# Patient Record
Sex: FEMALE | Race: BLACK OR AFRICAN AMERICAN | URBAN - METROPOLITAN AREA
[De-identification: names, ages, dates, MRNs, and addresses within clinical notes are randomized per-mention and may not be internally consistent; named-entity substitution may affect disease eponyms.]

---

## 2023-03-01 ENCOUNTER — INPATIENT (INPATIENT)
Facility: HOSPITAL | Age: 73
LOS: 0 days | Discharge: HOME | DRG: 305 | End: 2023-03-02
Attending: HOSPITALIST | Admitting: INTERNAL MEDICINE
Payer: MEDICARE

## 2023-03-01 VITALS
OXYGEN SATURATION: 98 % | TEMPERATURE: 98 F | SYSTOLIC BLOOD PRESSURE: 213 MMHG | DIASTOLIC BLOOD PRESSURE: 92 MMHG | RESPIRATION RATE: 19 BRPM | HEART RATE: 90 BPM

## 2023-03-01 DIAGNOSIS — R94.31 ABNORMAL ELECTROCARDIOGRAM [ECG] [EKG]: ICD-10-CM

## 2023-03-01 DIAGNOSIS — I10 ESSENTIAL (PRIMARY) HYPERTENSION: ICD-10-CM

## 2023-03-01 LAB
ALBUMIN SERPL ELPH-MCNC: 4.5 G/DL — SIGNIFICANT CHANGE UP (ref 3.5–5.2)
ALP SERPL-CCNC: 103 U/L — SIGNIFICANT CHANGE UP (ref 30–115)
ALT FLD-CCNC: 17 U/L — SIGNIFICANT CHANGE UP (ref 0–41)
ANION GAP SERPL CALC-SCNC: 12 MMOL/L — SIGNIFICANT CHANGE UP (ref 7–14)
APTT BLD: 30.4 SEC — SIGNIFICANT CHANGE UP (ref 27–39.2)
AST SERPL-CCNC: 30 U/L — SIGNIFICANT CHANGE UP (ref 0–41)
BASOPHILS # BLD AUTO: 0.06 K/UL — SIGNIFICANT CHANGE UP (ref 0–0.2)
BASOPHILS NFR BLD AUTO: 0.9 % — SIGNIFICANT CHANGE UP (ref 0–1)
BILIRUB SERPL-MCNC: 0.3 MG/DL — SIGNIFICANT CHANGE UP (ref 0.2–1.2)
BUN SERPL-MCNC: 21 MG/DL — HIGH (ref 10–20)
CALCIUM SERPL-MCNC: 9.2 MG/DL — SIGNIFICANT CHANGE UP (ref 8.4–10.5)
CHLORIDE SERPL-SCNC: 105 MMOL/L — SIGNIFICANT CHANGE UP (ref 98–110)
CO2 SERPL-SCNC: 24 MMOL/L — SIGNIFICANT CHANGE UP (ref 17–32)
CREAT SERPL-MCNC: 1 MG/DL — SIGNIFICANT CHANGE UP (ref 0.7–1.5)
EGFR: 60 ML/MIN/1.73M2 — SIGNIFICANT CHANGE UP
EOSINOPHIL # BLD AUTO: 0.05 K/UL — SIGNIFICANT CHANGE UP (ref 0–0.7)
EOSINOPHIL NFR BLD AUTO: 0.7 % — SIGNIFICANT CHANGE UP (ref 0–8)
GLUCOSE BLDC GLUCOMTR-MCNC: 104 MG/DL — HIGH (ref 70–99)
GLUCOSE BLDC GLUCOMTR-MCNC: 279 MG/DL — HIGH (ref 70–99)
GLUCOSE BLDC GLUCOMTR-MCNC: 40 MG/DL — CRITICAL LOW (ref 70–99)
GLUCOSE BLDC GLUCOMTR-MCNC: 62 MG/DL — LOW (ref 70–99)
GLUCOSE SERPL-MCNC: 192 MG/DL — HIGH (ref 70–99)
HCT VFR BLD CALC: 42.2 % — SIGNIFICANT CHANGE UP (ref 37–47)
HGB BLD-MCNC: 14.1 G/DL — SIGNIFICANT CHANGE UP (ref 12–16)
IMM GRANULOCYTES NFR BLD AUTO: 0.1 % — SIGNIFICANT CHANGE UP (ref 0.1–0.3)
INR BLD: 0.86 RATIO — SIGNIFICANT CHANGE UP (ref 0.65–1.3)
LYMPHOCYTES # BLD AUTO: 2.48 K/UL — SIGNIFICANT CHANGE UP (ref 1.2–3.4)
LYMPHOCYTES # BLD AUTO: 36 % — SIGNIFICANT CHANGE UP (ref 20.5–51.1)
MAGNESIUM SERPL-MCNC: 2.2 MG/DL — SIGNIFICANT CHANGE UP (ref 1.8–2.4)
MCHC RBC-ENTMCNC: 30.7 PG — SIGNIFICANT CHANGE UP (ref 27–31)
MCHC RBC-ENTMCNC: 33.4 G/DL — SIGNIFICANT CHANGE UP (ref 32–37)
MCV RBC AUTO: 91.7 FL — SIGNIFICANT CHANGE UP (ref 81–99)
MONOCYTES # BLD AUTO: 0.58 K/UL — SIGNIFICANT CHANGE UP (ref 0.1–0.6)
MONOCYTES NFR BLD AUTO: 8.4 % — SIGNIFICANT CHANGE UP (ref 1.7–9.3)
NEUTROPHILS # BLD AUTO: 3.7 K/UL — SIGNIFICANT CHANGE UP (ref 1.4–6.5)
NEUTROPHILS NFR BLD AUTO: 53.9 % — SIGNIFICANT CHANGE UP (ref 42.2–75.2)
NRBC # BLD: 0 /100 WBCS — SIGNIFICANT CHANGE UP (ref 0–0)
NT-PROBNP SERPL-SCNC: 130 PG/ML — SIGNIFICANT CHANGE UP (ref 0–300)
PLATELET # BLD AUTO: 221 K/UL — SIGNIFICANT CHANGE UP (ref 130–400)
POTASSIUM SERPL-MCNC: 4.4 MMOL/L — SIGNIFICANT CHANGE UP (ref 3.5–5)
POTASSIUM SERPL-SCNC: 4.4 MMOL/L — SIGNIFICANT CHANGE UP (ref 3.5–5)
PROT SERPL-MCNC: 8.6 G/DL — HIGH (ref 6–8)
PROTHROM AB SERPL-ACNC: 9.7 SEC — LOW (ref 9.95–12.87)
RBC # BLD: 4.6 M/UL — SIGNIFICANT CHANGE UP (ref 4.2–5.4)
RBC # FLD: 13.4 % — SIGNIFICANT CHANGE UP (ref 11.5–14.5)
SARS-COV-2 RNA SPEC QL NAA+PROBE: SIGNIFICANT CHANGE UP
SODIUM SERPL-SCNC: 141 MMOL/L — SIGNIFICANT CHANGE UP (ref 135–146)
TROPONIN T SERPL-MCNC: <0.01 NG/ML — SIGNIFICANT CHANGE UP
WBC # BLD: 6.88 K/UL — SIGNIFICANT CHANGE UP (ref 4.8–10.8)
WBC # FLD AUTO: 6.88 K/UL — SIGNIFICANT CHANGE UP (ref 4.8–10.8)

## 2023-03-01 PROCEDURE — 85025 COMPLETE CBC W/AUTO DIFF WBC: CPT

## 2023-03-01 PROCEDURE — 83036 HEMOGLOBIN GLYCOSYLATED A1C: CPT

## 2023-03-01 PROCEDURE — 99291 CRITICAL CARE FIRST HOUR: CPT

## 2023-03-01 PROCEDURE — 99223 1ST HOSP IP/OBS HIGH 75: CPT

## 2023-03-01 PROCEDURE — 71045 X-RAY EXAM CHEST 1 VIEW: CPT | Mod: 26

## 2023-03-01 PROCEDURE — 83735 ASSAY OF MAGNESIUM: CPT

## 2023-03-01 PROCEDURE — 82962 GLUCOSE BLOOD TEST: CPT

## 2023-03-01 PROCEDURE — 86803 HEPATITIS C AB TEST: CPT

## 2023-03-01 PROCEDURE — 36415 COLL VENOUS BLD VENIPUNCTURE: CPT

## 2023-03-01 PROCEDURE — 80053 COMPREHEN METABOLIC PANEL: CPT

## 2023-03-01 RX ORDER — AMLODIPINE BESYLATE 2.5 MG/1
5 TABLET ORAL DAILY
Refills: 0 | Status: DISCONTINUED | OUTPATIENT
Start: 2023-03-01 | End: 2023-03-01

## 2023-03-01 RX ORDER — OXYBUTYNIN CHLORIDE 5 MG
1 TABLET ORAL
Qty: 0 | Refills: 0 | DISCHARGE

## 2023-03-01 RX ORDER — NITROGLYCERIN 6.5 MG
1 CAPSULE, EXTENDED RELEASE ORAL
Qty: 0 | Refills: 0 | DISCHARGE

## 2023-03-01 RX ORDER — SEMAGLUTIDE 0.68 MG/ML
1 INJECTION, SOLUTION SUBCUTANEOUS
Qty: 0 | Refills: 0 | DISCHARGE

## 2023-03-01 RX ORDER — SODIUM CHLORIDE 9 MG/ML
1000 INJECTION, SOLUTION INTRAVENOUS
Refills: 0 | Status: DISCONTINUED | OUTPATIENT
Start: 2023-03-01 | End: 2023-03-02

## 2023-03-01 RX ORDER — DEXTROSE 50 % IN WATER 50 %
12.5 SYRINGE (ML) INTRAVENOUS ONCE
Refills: 0 | Status: DISCONTINUED | OUTPATIENT
Start: 2023-03-01 | End: 2023-03-02

## 2023-03-01 RX ORDER — ATORVASTATIN CALCIUM 80 MG/1
80 TABLET, FILM COATED ORAL AT BEDTIME
Refills: 0 | Status: DISCONTINUED | OUTPATIENT
Start: 2023-03-01 | End: 2023-03-02

## 2023-03-01 RX ORDER — METOPROLOL TARTRATE 50 MG
20 TABLET ORAL ONCE
Refills: 0 | Status: DISCONTINUED | OUTPATIENT
Start: 2023-03-01 | End: 2023-03-01

## 2023-03-01 RX ORDER — DEXTROSE 50 % IN WATER 50 %
15 SYRINGE (ML) INTRAVENOUS ONCE
Refills: 0 | Status: DISCONTINUED | OUTPATIENT
Start: 2023-03-01 | End: 2023-03-02

## 2023-03-01 RX ORDER — LABETALOL HCL 100 MG
20 TABLET ORAL ONCE
Refills: 0 | Status: COMPLETED | OUTPATIENT
Start: 2023-03-01 | End: 2023-03-01

## 2023-03-01 RX ORDER — HYDRALAZINE HCL 50 MG
50 TABLET ORAL THREE TIMES A DAY
Refills: 0 | Status: DISCONTINUED | OUTPATIENT
Start: 2023-03-01 | End: 2023-03-02

## 2023-03-01 RX ORDER — ENOXAPARIN SODIUM 100 MG/ML
40 INJECTION SUBCUTANEOUS EVERY 24 HOURS
Refills: 0 | Status: DISCONTINUED | OUTPATIENT
Start: 2023-03-01 | End: 2023-03-02

## 2023-03-01 RX ORDER — INSULIN GLARGINE 100 [IU]/ML
30 INJECTION, SOLUTION SUBCUTANEOUS AT BEDTIME
Refills: 0 | Status: DISCONTINUED | OUTPATIENT
Start: 2023-03-01 | End: 2023-03-01

## 2023-03-01 RX ORDER — INSULIN GLARGINE 100 [IU]/ML
30 INJECTION, SOLUTION SUBCUTANEOUS
Qty: 0 | Refills: 0 | DISCHARGE

## 2023-03-01 RX ORDER — HYDRALAZINE HCL 50 MG
10 TABLET ORAL ONCE
Refills: 0 | Status: COMPLETED | OUTPATIENT
Start: 2023-03-01 | End: 2023-03-01

## 2023-03-01 RX ORDER — EMPAGLIFLOZIN 10 MG/1
1 TABLET, FILM COATED ORAL
Qty: 0 | Refills: 0 | DISCHARGE

## 2023-03-01 RX ORDER — NITROGLYCERIN 6.5 MG
0.4 CAPSULE, EXTENDED RELEASE ORAL
Refills: 0 | Status: DISCONTINUED | OUTPATIENT
Start: 2023-03-01 | End: 2023-03-02

## 2023-03-01 RX ORDER — DEXTROSE 50 % IN WATER 50 %
25 SYRINGE (ML) INTRAVENOUS ONCE
Refills: 0 | Status: DISCONTINUED | OUTPATIENT
Start: 2023-03-01 | End: 2023-03-02

## 2023-03-01 RX ORDER — VALSARTAN 80 MG/1
80 TABLET ORAL DAILY
Refills: 0 | Status: DISCONTINUED | OUTPATIENT
Start: 2023-03-01 | End: 2023-03-02

## 2023-03-01 RX ORDER — LATANOPROST 0.05 MG/ML
1 SOLUTION/ DROPS OPHTHALMIC; TOPICAL
Qty: 0 | Refills: 0 | DISCHARGE

## 2023-03-01 RX ORDER — ACETAMINOPHEN 500 MG
975 TABLET ORAL ONCE
Refills: 0 | Status: COMPLETED | OUTPATIENT
Start: 2023-03-01 | End: 2023-03-01

## 2023-03-01 RX ORDER — OXYBUTYNIN CHLORIDE 5 MG
10 TABLET ORAL DAILY
Refills: 0 | Status: DISCONTINUED | OUTPATIENT
Start: 2023-03-01 | End: 2023-03-02

## 2023-03-01 RX ORDER — LATANOPROST 0.05 MG/ML
1 SOLUTION/ DROPS OPHTHALMIC; TOPICAL AT BEDTIME
Refills: 0 | Status: DISCONTINUED | OUTPATIENT
Start: 2023-03-01 | End: 2023-03-02

## 2023-03-01 RX ORDER — ATORVASTATIN CALCIUM 80 MG/1
1 TABLET, FILM COATED ORAL
Qty: 0 | Refills: 0 | DISCHARGE

## 2023-03-01 RX ORDER — LOSARTAN POTASSIUM 100 MG/1
100 TABLET, FILM COATED ORAL DAILY
Refills: 0 | Status: DISCONTINUED | OUTPATIENT
Start: 2023-03-01 | End: 2023-03-01

## 2023-03-01 RX ORDER — METOPROLOL TARTRATE 50 MG
1 TABLET ORAL
Qty: 0 | Refills: 0 | DISCHARGE

## 2023-03-01 RX ORDER — METOPROLOL TARTRATE 50 MG
200 TABLET ORAL DAILY
Refills: 0 | Status: DISCONTINUED | OUTPATIENT
Start: 2023-03-01 | End: 2023-03-02

## 2023-03-01 RX ORDER — INSULIN GLARGINE 100 [IU]/ML
30 INJECTION, SOLUTION SUBCUTANEOUS EVERY MORNING
Refills: 0 | Status: DISCONTINUED | OUTPATIENT
Start: 2023-03-01 | End: 2023-03-02

## 2023-03-01 RX ORDER — LABETALOL HCL 100 MG
10 TABLET ORAL ONCE
Refills: 0 | Status: COMPLETED | OUTPATIENT
Start: 2023-03-01 | End: 2023-03-01

## 2023-03-01 RX ORDER — INSULIN LISPRO 100/ML
VIAL (ML) SUBCUTANEOUS
Refills: 0 | Status: DISCONTINUED | OUTPATIENT
Start: 2023-03-01 | End: 2023-03-02

## 2023-03-01 RX ORDER — FUROSEMIDE 40 MG
1 TABLET ORAL
Qty: 0 | Refills: 0 | DISCHARGE

## 2023-03-01 RX ORDER — GLUCAGON INJECTION, SOLUTION 0.5 MG/.1ML
1 INJECTION, SOLUTION SUBCUTANEOUS ONCE
Refills: 0 | Status: DISCONTINUED | OUTPATIENT
Start: 2023-03-01 | End: 2023-03-02

## 2023-03-01 RX ORDER — AMLODIPINE BESYLATE 2.5 MG/1
10 TABLET ORAL ONCE
Refills: 0 | Status: COMPLETED | OUTPATIENT
Start: 2023-03-01 | End: 2023-03-01

## 2023-03-01 RX ADMIN — Medication 975 MILLIGRAM(S): at 14:30

## 2023-03-01 RX ADMIN — Medication 10 MILLIGRAM(S): at 13:18

## 2023-03-01 RX ADMIN — Medication 50 MILLIGRAM(S): at 22:38

## 2023-03-01 RX ADMIN — AMLODIPINE BESYLATE 10 MILLIGRAM(S): 2.5 TABLET ORAL at 12:14

## 2023-03-01 RX ADMIN — ENOXAPARIN SODIUM 40 MILLIGRAM(S): 100 INJECTION SUBCUTANEOUS at 22:38

## 2023-03-01 RX ADMIN — ATORVASTATIN CALCIUM 80 MILLIGRAM(S): 80 TABLET, FILM COATED ORAL at 22:39

## 2023-03-01 RX ADMIN — Medication 10 MILLIGRAM(S): at 23:34

## 2023-03-01 NOTE — ED PROVIDER NOTE - CLINICAL SUMMARY MEDICAL DECISION MAKING FREE TEXT BOX
72-year-old female with history of hypertension on multiple medications for this (states she took it today), hyperlipidemia, diabetes, presents as she states her body was not feeling well this morning and she was hypertensive. She states this was all caused by having to argue over an [employer] to get her over to the ophthalmology clinic for an appointment today. It is now resolved. Denies ever having had chest pain, shortness of breath, vomiting, dizziness, diaphoresis, cough, fever, leg pain or swelling, recent long distance travel. On exam, afebrile, hemodynamically stable, hypertensive, saturating well on room air, NAD, well appearing, sitting comfortably in bed, no WOB, speaking full sentences, head NCAT, EOMI, anicteric, MMM, no JVD, RRR, nml S1/S2, no m/r/g, lungs CTAB, no w/r/r, abd soft, NT, ND, nml BS, no rebound or guarding, AAO, CN's 3-12 intact, motor 5/5 in all extremities, ESTRADA spontaneously, no leg cyanosis or edema, 2+ symmetric radial pulses, skin warm, well perfused, no rashes or hives. Patient with nonspecific symptoms. ECG concerning, though with no chest pain/shortness of breath and states had a negative stress test in November, and troponin negative. Code STEMI called on arrival due to submillimeter aVR ST elevation, canceled due to lack of symptoms at this time. No stroke signs or symptoms. No evidence of fluid overload. Cr unremarkable. Patient's hypertension unimproved despite p.o. amlodipine, given 10 mg then 20 mg labetalol with improvement. Asymptomatic throughout and do not think patient needs to be in ICU at this time for drip. Patient well appearing, hemodynamically stable. Admitted to internal medicine for further monitoring, w/u, and care.

## 2023-03-01 NOTE — CHART NOTE - NSCHARTNOTEFT_GEN_A_CORE
Code STEMI was activated for this patient to which I responded immediately. 73 Y/O F with PMHx of HTN, HLD, DM presented to the hospital for generalized weakness. Pt denied any chest pain. EKG was reviewed it showed TWI in lateral leads. Case was discussed with interventional cardiologist on call and code STEMI was cancelled.

## 2023-03-01 NOTE — H&P ADULT - ASSESSMENT
72-year-old female with past medical history of hypertension, DM-2, hyperlipidemia presented to the ED for being anxious and not feeling well.  Admitted for hypertensive emergency.       #Hypertensive Emergency   #Anxious  #[place]  - BP at arrival 213/19  - Received Amlodipine and labetalol pushes in ED.  - Restarted on home meds ie Hydralazine, valsartan, metoprolol.  - BP now improving 198/92. Patient currently reports no symptoms.  - Closely monitor BP      #DM  -Takes lantus, ozempic and jordiance at home  -will start on basal bolus insulin during hospital stay    #Hyperlipidemia  -takes atorvastatin 80mg OD  -continue home dose       DVT prophylaxis: ENOXAPARIN   Activity:  Diet: DASH  Dispo:  Code: FULL         72-year-old female with past medical history of hypertension, DM-2, hyperlipidemia presented to the ED for being anxious and not feeling well.  Admitted for hypertensive emergency.       #Hypertensive Emergency   #Anxious  #Headache  - BP at arrival 213/19  - Received Amlodipine and labetalol pushes in ED.  - Restarted on home meds ie Hydralazine, valsartan, metoprolol.  - BP now improving 198/92. Patient currently reports no symptoms.  - Closely monitor BP      #DM  -Takes lantus, ozempic and jordiance at home  -will start on basal bolus insulin during hospital stay    #Hyperlipidemia  -takes atorvastatin 80mg OD  -continue home dose       DVT prophylaxis: ENOXAPARIN   Activity:  Diet: DASH  Dispo:  Code: FULL      Dr. Lacho Tracey  Attending Hospitalist    72-year-old female with past medical history of hypertension, DM-2, hyperlipidemia presented to the ED for being anxious and not feeling well.  Admitted for hypertensive emergency.       #Hypertensive Emergency   #Anxious  #Headache  - BP at arrival 213/19  - Received Amlodipine and labetalol pushes in ED.  - Restarted on home meds ie Hydralazine, valsartan, metoprolol.  - BP now improving 198/92. Patient currently reports no symptoms.  - Closely monitor BP  - Check TSH and renal artery doppler      #DM  -Takes lantus, ozempic and jordiance at home  -will start on basal bolus insulin during hospital stay    #Hyperlipidemia  -takes atorvastatin 80mg OD  -continue home dose       DVT prophylaxis: ENOXAPARIN   Activity:  Diet: DASH  Dispo:  Code: FULL      Dr. Lacho Tracey  Attending Hospitalist

## 2023-03-01 NOTE — ED ADULT NURSE NOTE - OBJECTIVE STATEMENT
Pt c/o feeling "weird" describes her body as feeling "loose" s/p becoming aggravated in the morning. Pt denies any chest pain, shortness of breath, numbness, tingling.

## 2023-03-01 NOTE — H&P ADULT - NSHPPHYSICALEXAM_GEN_ALL_CORE
LOS:     VITALS:   T(C): 36.6 (03-01-23 @ 10:15), Max: 36.6 (03-01-23 @ 10:15)  HR: 64 (03-01-23 @ 15:24) (64 - 90)  BP: 198/92 (03-01-23 @ 15:24) (188/87 - 225/101)  RR: 22 (03-01-23 @ 11:45) (19 - 22)  SpO2: 100% (03-01-23 @ 11:45) (98% - 100%)    GENERAL: NAD, lying in bed comfortably  HEAD:  Atraumatic, Normocephalic  EYES: EOMI, PERRLA, conjunctiva and sclera clear  ENT: Moist mucous membranes  NECK: Supple, No JVD  CHEST/LUNG: Clear to auscultation bilaterally; No rales, rhonchi, wheezing, or rubs. Unlabored respirations  HEART: Regular rate and rhythm; No murmurs, rubs, or gallops  ABDOMEN: BSx4; Soft, nontender, nondistended  EXTREMITIES:  no edema  NERVOUS SYSTEM:  A&Ox3, no focal deficits

## 2023-03-01 NOTE — ED PROVIDER NOTE - ATTENDING APP SHARED VISIT CONTRIBUTION OF CARE
72-year-old female with history of hypertension on multiple medications for this (states she took it today), hyperlipidemia, diabetes, presents as she states her body was not feeling well this morning and she was hypertensive. She states this was all caused by having to argue over an [employer] to get her over to the ophthalmology clinic for an appointment today. It is now resolved. Denies ever having had chest pain, shortness of breath, vomiting, dizziness, diaphoresis, cough, fever, leg pain or swelling, recent long distance travel. On exam, afebrile, hemodynamically stable, hypertensive, saturating well on room air, NAD, well appearing, sitting comfortably in bed, no WOB, speaking full sentences, head NCAT, EOMI, anicteric, MMM, no JVD, RRR, nml S1/S2, no m/r/g, lungs CTAB, no w/r/r, abd soft, NT, ND, nml BS, no rebound or guarding, AAO, CN's 3-12 intact, motor 5/5 in all extremities, ESTRADA spontaneously, no leg cyanosis or edema, 2+ symmetric radial pulses, skin warm, well perfused, no rashes or hives. Patient with nonspecific symptoms. ECG concerning, though with no chest pain/shortness of breath and states had a negative stress test in November, and troponin ________. Code STEMI called on arrival due to submillimeter aVR ST elevation, canceled due to lack of symptoms at this time. No stroke signs or symptoms. No evidence of fluid overload. Cr ______. Pt's hypertension improved

## 2023-03-01 NOTE — ED PROVIDER NOTE - OBJECTIVE STATEMENT
72y F pmh HTN, HLD, DM presents for evaluation. Pt states she came to hospital today for ophthalmology appt but became upset when she got in an argument with her  and arrived for to the ophthalmology office to be told her appt is tomorrow not today. After this pt started to feel a generalized discomfort across her body leading her to come to ED. Found to be HTN and st elevations on EKG in triage. Cardiologist Dr. Mejia. Denies fever, ha, cp, sob, cough, weakness, numbness, dysuria, hematuria, n/v/d/c

## 2023-03-01 NOTE — ED ADULT NURSE NOTE - CHIEF COMPLAINT QUOTE
"I feel my blood pressure soaring high. I was aggravated today with my [employer] , then when I got to the eye clinic, my appointment is actually for tomorrow. I got more aggravated." Triage BP: Left Arm 213/93, Right Arm 211/90. Pt took Metoprolol prior to leaving her house this morning. EKG done. Code STEMI activated.

## 2023-03-01 NOTE — ED PROVIDER NOTE - ATTENDING CONTRIBUTION TO CARE
Attending Statement: This was a shared visit with the RAHUL. I reviewed and verified the documentation and independently performed the documented:     History, Exam and Medical Decision Making.     Attending Contribution to Care: 72-year-old female with history of hypertension on multiple medications for this (states she took it today), hyperlipidemia, diabetes, presents as she states her body was not feeling well this morning and she was hypertensive. She states this was all caused by having to argue over an [employer] to get her over to the ophthalmology clinic for an appointment today. It is now resolved. Denies ever having had chest pain, shortness of breath, vomiting, dizziness, diaphoresis, cough, fever, leg pain or swelling, recent long distance travel. On exam, afebrile, hemodynamically stable, hypertensive, saturating well on room air, NAD, well appearing, sitting comfortably in bed, no WOB, speaking full sentences, head NCAT, EOMI, anicteric, MMM, no JVD, RRR, nml S1/S2, no m/r/g, lungs CTAB, no w/r/r, abd soft, NT, ND, nml BS, no rebound or guarding, AAO, CN's 3-12 intact, motor 5/5 in all extremities, ESTRADA spontaneously, no leg cyanosis or edema, 2+ symmetric radial pulses, skin warm, well perfused, no rashes or hives. Patient with nonspecific symptoms. ECG concerning, though with no chest pain/shortness of breath and states had a negative stress test in November, and troponin negative. Code STEMI called on arrival due to submillimeter aVR ST elevation, canceled due to lack of symptoms at this time. No stroke signs or symptoms. No evidence of fluid overload. Cr unremarkable. Patient's hypertension unimproved despite p.o. amlodipine, given 10 mg then 20 mg labetalol with improvement. Asymptomatic throughout and do not think patient needs to be in ICU at this time for drip. Patient well appearing, hemodynamically stable. Admitted to internal medicine for further monitoring, w/u, and care.

## 2023-03-01 NOTE — ED PROVIDER NOTE - NS ED ATTENDING STATEMENT MOD
This was a shared visit with the RAHUL. I reviewed and verified the documentation and independently performed the documented: I have personally provided the amount of critical care time documented below concurrently with the resident/fellow.  This time excludes time spent on separate procedures and time spent teaching. I have reviewed the resident’s / fellow’s documentation and I agree with the history, exam, and assessment and plan of care.

## 2023-03-01 NOTE — H&P ADULT - ATTENDING COMMENTS
72-year-old female with past medical history of hypertension, DM-2, hyperlipidemia presented to the ED for being anxious and not feeling well.  Admitted for hypertensive emergency.       #Hypertensive Emergency   #Anxious  #Headache  - BP at arrival 213/19  - Received Amlodipine and labetalol pushes in ED.  - Restarted on home meds ie Hydralazine, valsartan, metoprolol.  - BP now improving 198/92. Patient currently reports no symptoms.  - Closely monitor BP      #DM  -Takes lantus, ozempic and jordiance at home  -will start on basal bolus insulin during hospital stay    #Hyperlipidemia  -takes atorvastatin 80mg OD  -continue home dose       DVT prophylaxis: ENOXAPARIN   Activity:  Diet: DASH  Dispo:  Code: FULL      Dr. Lacho Tracey  Attending Hospitalist 72-year-old female with past medical history of hypertension, DM-2, hyperlipidemia presented to the ED for being anxious and not feeling well.  Admitted for hypertensive emergency.       #Hypertensive Emergency   #Anxious  #Headache  - BP at arrival 213/19  - Received Amlodipine and labetalol pushes in ED.  - Restarted on home meds ie Hydralazine, valsartan, metoprolol.  - BP now improving 198/92. Patient currently reports no symptoms.  - Closely monitor BP  -check TSH and renal artery doppler      #DM  -Takes lantus, ozempic and jordiance at home  -will start on basal bolus insulin during hospital stay    #Hyperlipidemia  -takes atorvastatin 80mg OD  -continue home dose       DVT prophylaxis: ENOXAPARIN   Activity:  Diet: DASH  Dispo:  Code: FULL      Dr. Lahco Tracey  Attending Hospitalist

## 2023-03-01 NOTE — H&P ADULT - NSHPREVIEWOFSYSTEMS_GEN_ALL_CORE
REVIEW OF SYSTEMS:    CONSTITUTIONAL:  No weakness, fevers or chills, headache+, anxious+  EYES/ENT:  No visual changes;  No vertigo or throat pain   NECK:  No pain or stiffness  RESPIRATORY:  No cough, wheezing, hemoptysis; No shortness of breath  CARDIOVASCULAR:  No chest pain or palpitations  GASTROINTESTINAL:  No abdominal or epigastric pain. No nausea, vomiting, or hematemesis; No diarrhea or constipation. No melena or hematochezia.  GENITOURINARY:  No dysuria, frequency or hematuria  NEUROLOGICAL:  No numbness or weakness  SKIN:  No itching, rashes

## 2023-03-01 NOTE — ED ADULT TRIAGE NOTE - CHIEF COMPLAINT QUOTE
"I feel my blood pressure soaring high. I was aggravated today with my [employer] , then when I got to the eye clinic, my appointment is actually for tomorrow. I got more aggravated." Triage BP: Left Arm 213/93, Right Arm 211/90. Pt took Metoprolol prior to leaving her house this morning. "I feel my blood pressure soaring high. I was aggravated today with my [employer] , then when I got to the eye clinic, my appointment is actually for tomorrow. I got more aggravated." Triage BP: Left Arm 213/93, Right Arm 211/90. Pt took Metoprolol prior to leaving her house this morning. EKG done. Code STEMI activated.

## 2023-03-01 NOTE — ED PROVIDER NOTE - ATTENDING SHARED VISIT SELECTORS
History/Exam/Medical Decision Making
[facility]  CARDIOVASCULAR DISEASE  [address]  [place] [address]  Phone: [phone]  Fax: [phone]  Follow Up Time: 1-3 Days    Ananda Rahman  INTERNAL MEDICINE  [address] [place] [address]  Phone: [phone]  Fax: [phone]  Follow Up Time: 1-3 Days

## 2023-03-01 NOTE — PATIENT PROFILE ADULT - FALL HARM RISK - HARM RISK INTERVENTIONS

## 2023-03-01 NOTE — H&P ADULT - NSICDXPASTMEDICALHX_GEN_ALL_CORE_FT
PAST MEDICAL HISTORY:  DMII (diabetes mellitus, type 2)     HTN (hypertension)     Hyperlipidemia

## 2023-03-02 ENCOUNTER — TRANSCRIPTION ENCOUNTER (OUTPATIENT)
Age: 73
End: 2023-03-02

## 2023-03-02 VITALS — HEART RATE: 74 BPM | DIASTOLIC BLOOD PRESSURE: 64 MMHG | SYSTOLIC BLOOD PRESSURE: 140 MMHG

## 2023-03-02 LAB
A1C WITH ESTIMATED AVERAGE GLUCOSE RESULT: 6.1 % — HIGH (ref 4–5.6)
ALBUMIN SERPL ELPH-MCNC: 3.8 G/DL — SIGNIFICANT CHANGE UP (ref 3.5–5.2)
ALP SERPL-CCNC: 93 U/L — SIGNIFICANT CHANGE UP (ref 30–115)
ALT FLD-CCNC: 14 U/L — SIGNIFICANT CHANGE UP (ref 0–41)
ANION GAP SERPL CALC-SCNC: 10 MMOL/L — SIGNIFICANT CHANGE UP (ref 7–14)
AST SERPL-CCNC: 17 U/L — SIGNIFICANT CHANGE UP (ref 0–41)
BASOPHILS # BLD AUTO: 0.05 K/UL — SIGNIFICANT CHANGE UP (ref 0–0.2)
BASOPHILS NFR BLD AUTO: 0.8 % — SIGNIFICANT CHANGE UP (ref 0–1)
BILIRUB SERPL-MCNC: 0.2 MG/DL — SIGNIFICANT CHANGE UP (ref 0.2–1.2)
BUN SERPL-MCNC: 22 MG/DL — HIGH (ref 10–20)
CALCIUM SERPL-MCNC: 9.3 MG/DL — SIGNIFICANT CHANGE UP (ref 8.4–10.5)
CHLORIDE SERPL-SCNC: 108 MMOL/L — SIGNIFICANT CHANGE UP (ref 98–110)
CO2 SERPL-SCNC: 26 MMOL/L — SIGNIFICANT CHANGE UP (ref 17–32)
CREAT SERPL-MCNC: 1 MG/DL — SIGNIFICANT CHANGE UP (ref 0.7–1.5)
EGFR: 60 ML/MIN/1.73M2 — SIGNIFICANT CHANGE UP
EOSINOPHIL # BLD AUTO: 0.07 K/UL — SIGNIFICANT CHANGE UP (ref 0–0.7)
EOSINOPHIL NFR BLD AUTO: 1.1 % — SIGNIFICANT CHANGE UP (ref 0–8)
ESTIMATED AVERAGE GLUCOSE: 128 MG/DL — HIGH (ref 68–114)
GLUCOSE BLDC GLUCOMTR-MCNC: 212 MG/DL — HIGH (ref 70–99)
GLUCOSE BLDC GLUCOMTR-MCNC: 68 MG/DL — LOW (ref 70–99)
GLUCOSE SERPL-MCNC: 77 MG/DL — SIGNIFICANT CHANGE UP (ref 70–99)
HCT VFR BLD CALC: 40.1 % — SIGNIFICANT CHANGE UP (ref 37–47)
HCV AB S/CO SERPL IA: 0.04 COI — SIGNIFICANT CHANGE UP
HCV AB SERPL-IMP: SIGNIFICANT CHANGE UP
HGB BLD-MCNC: 13.1 G/DL — SIGNIFICANT CHANGE UP (ref 12–16)
IMM GRANULOCYTES NFR BLD AUTO: 0.2 % — SIGNIFICANT CHANGE UP (ref 0.1–0.3)
LYMPHOCYTES # BLD AUTO: 1.83 K/UL — SIGNIFICANT CHANGE UP (ref 1.2–3.4)
LYMPHOCYTES # BLD AUTO: 29 % — SIGNIFICANT CHANGE UP (ref 20.5–51.1)
MAGNESIUM SERPL-MCNC: 2.2 MG/DL — SIGNIFICANT CHANGE UP (ref 1.8–2.4)
MCHC RBC-ENTMCNC: 30.1 PG — SIGNIFICANT CHANGE UP (ref 27–31)
MCHC RBC-ENTMCNC: 32.7 G/DL — SIGNIFICANT CHANGE UP (ref 32–37)
MCV RBC AUTO: 92.2 FL — SIGNIFICANT CHANGE UP (ref 81–99)
MONOCYTES # BLD AUTO: 0.57 K/UL — SIGNIFICANT CHANGE UP (ref 0.1–0.6)
MONOCYTES NFR BLD AUTO: 9 % — SIGNIFICANT CHANGE UP (ref 1.7–9.3)
NEUTROPHILS # BLD AUTO: 3.78 K/UL — SIGNIFICANT CHANGE UP (ref 1.4–6.5)
NEUTROPHILS NFR BLD AUTO: 59.9 % — SIGNIFICANT CHANGE UP (ref 42.2–75.2)
NRBC # BLD: 0 /100 WBCS — SIGNIFICANT CHANGE UP (ref 0–0)
PLATELET # BLD AUTO: 203 K/UL — SIGNIFICANT CHANGE UP (ref 130–400)
POTASSIUM SERPL-MCNC: 3.4 MMOL/L — LOW (ref 3.5–5)
POTASSIUM SERPL-SCNC: 3.4 MMOL/L — LOW (ref 3.5–5)
PROT SERPL-MCNC: 7.3 G/DL — SIGNIFICANT CHANGE UP (ref 6–8)
RBC # BLD: 4.35 M/UL — SIGNIFICANT CHANGE UP (ref 4.2–5.4)
RBC # FLD: 13.4 % — SIGNIFICANT CHANGE UP (ref 11.5–14.5)
SODIUM SERPL-SCNC: 144 MMOL/L — SIGNIFICANT CHANGE UP (ref 135–146)
WBC # BLD: 6.31 K/UL — SIGNIFICANT CHANGE UP (ref 4.8–10.8)
WBC # FLD AUTO: 6.31 K/UL — SIGNIFICANT CHANGE UP (ref 4.8–10.8)

## 2023-03-02 PROCEDURE — 99239 HOSP IP/OBS DSCHRG MGMT >30: CPT

## 2023-03-02 RX ORDER — HYDRALAZINE HCL 50 MG
1 TABLET ORAL
Qty: 0 | Refills: 0 | DISCHARGE

## 2023-03-02 RX ORDER — VALSARTAN 80 MG/1
160 TABLET ORAL DAILY
Refills: 0 | Status: DISCONTINUED | OUTPATIENT
Start: 2023-03-03 | End: 2023-03-02

## 2023-03-02 RX ORDER — VALSARTAN 80 MG/1
100 TABLET ORAL
Qty: 0 | Refills: 0 | DISCHARGE

## 2023-03-02 RX ORDER — VALSARTAN 80 MG/1
1 TABLET ORAL
Qty: 30 | Refills: 0
Start: 2023-03-02 | End: 2023-03-31

## 2023-03-02 RX ORDER — HYDRALAZINE HCL 50 MG
50 TABLET ORAL ONCE
Refills: 0 | Status: COMPLETED | OUTPATIENT
Start: 2023-03-02 | End: 2023-03-02

## 2023-03-02 RX ORDER — VALSARTAN 80 MG/1
80 TABLET ORAL ONCE
Refills: 0 | Status: COMPLETED | OUTPATIENT
Start: 2023-03-02 | End: 2023-03-02

## 2023-03-02 RX ORDER — POTASSIUM CHLORIDE 20 MEQ
40 PACKET (EA) ORAL ONCE
Refills: 0 | Status: COMPLETED | OUTPATIENT
Start: 2023-03-02 | End: 2023-03-02

## 2023-03-02 RX ADMIN — Medication 50 MILLIGRAM(S): at 09:40

## 2023-03-02 RX ADMIN — Medication 200 MILLIGRAM(S): at 06:39

## 2023-03-02 RX ADMIN — VALSARTAN 80 MILLIGRAM(S): 80 TABLET ORAL at 09:39

## 2023-03-02 RX ADMIN — Medication 10 MILLIGRAM(S): at 11:26

## 2023-03-02 RX ADMIN — Medication 2: at 11:26

## 2023-03-02 RX ADMIN — Medication 50 MILLIGRAM(S): at 06:38

## 2023-03-02 RX ADMIN — Medication 40 MILLIEQUIVALENT(S): at 09:39

## 2023-03-02 RX ADMIN — VALSARTAN 80 MILLIGRAM(S): 80 TABLET ORAL at 06:39

## 2023-03-02 NOTE — DISCHARGE NOTE NURSING/CASE MANAGEMENT/SOCIAL WORK - PATIENT PORTAL LINK FT
You can access the FollowMyHealth Patient Portal offered by [facility] by registering at the following website: [email or web address]. By joining [employer]’s FollowMyHealth portal, you will also be able to view your health information using other applications (apps) compatible with our system.

## 2023-03-02 NOTE — DISCHARGE NOTE PROVIDER - NSDCCPCAREPLAN_GEN_ALL_CORE_FT
PRINCIPAL DISCHARGE DIAGNOSIS  Diagnosis: HTN (hypertension)  Assessment and Plan of Treatment: Hypertension  Hypertension, commonly called high blood pressure, is when the force of blood pumping through your arteries is too strong. Hypertension forces your heart to work harder to pump blood. Your arteries may become narrow or stiff. Having untreated or uncontrolled hypertension for a long period of time can cause heart attack, stroke, kidney disease, and other problems. If started on a medication, take exactly as prescribed by your health care professional. Maintain a healthy lifestyle and follow up with your primary care physician.  SEEK IMMEDIATE MEDICAL CARE IF YOU HAVE ANY OF THE FOLLOWING SYMPTOMS: severe headache, confusion, chest pain, abdominal pain, vomiting, or shortness of breath.        SECONDARY DISCHARGE DIAGNOSES  Diagnosis: HTN (hypertension)  Assessment and Plan of Treatment:      PRINCIPAL DISCHARGE DIAGNOSIS  Diagnosis: HTN (hypertension)  Assessment and Plan of Treatment: Hypertension  Hypertension, commonly called high blood pressure, is when the force of blood pumping through your arteries is too strong. Hypertension forces your heart to work harder to pump blood. Your arteries may become narrow or stiff. Having untreated or uncontrolled hypertension for a long period of time can cause heart attack, stroke, kidney disease, and other problems. If started on a medication, take exactly as prescribed by your health care professional. Maintain a healthy lifestyle and follow up with your primary care physician.  SEEK IMMEDIATE MEDICAL CARE IF YOU HAVE ANY OF THE FOLLOWING SYMPTOMS: severe headache, confusion, chest pain, abdominal pain, vomiting, or shortness of breath.  Please follow up with your primary care physician next week. Pleae monitor your blood pressure.      SECONDARY DISCHARGE DIAGNOSES  Diagnosis: HTN (hypertension)  Assessment and Plan of Treatment:

## 2023-03-02 NOTE — DISCHARGE NOTE PROVIDER - ATTENDING DISCHARGE PHYSICAL EXAMINATION:
Attending attestation  Attending DC note  Pt seen and examined at bedside. No cp or sob. Trop neg. Echo outpt. BP stable. Advised to monitor BP and follow up with primary care physician on Monday.   vitals, labs, exam stable  Hospital course as above.  Plan dw pt and agreed to plan  Medically cleared for DC. Med recc completed.  YUSUF resident. Spent 32 mins on case   Attending attestation  Attending DC note  Pt seen and examined at bedside. No cp or sob. Trop neg. Echo outpt. BP stable. Advised to monitor BP and follow up with primary care physician on Monday.  Pt denies anxiety   vitals, labs, exam stable  Hospital course as above.  Plan dw pt and agreed to plan  Medically cleared for DC. Med recc completed.  YUSUF resident. Spent 32 mins on case

## 2023-03-02 NOTE — DISCHARGE NOTE PROVIDER - HOSPITAL COURSE
HPI:  72-year-old female with past medical history of hypertension, DM-2, hyperlipidemia presented to the ED for being anxious and not feeling well. Patient states that she was not feeling well since this morning and she was hypertensive at home. She states that, this all started when she had an argument with the [employer]  today morning to get her over to the ophthalmology clinic for an appointment today. Her anxiety worsened when she came to know at the eye clinic that her appointment is scheduled for tomorrow and not today. She states that after all this she became very anxious, and frustrated and decided to go the hospital as she was thinking some thing is wrong with her body. Also reports active headache. She further states that this is her first visit to this hospital and she usually go a hospital in the [place] where she has established care from many years. As per patient, her Blood pressure is not well controlled despite taking multiple medications for her BP. She regularly records her BP at home and readings are frequently around 180s mmhg and they rarely reach 150mmhg which is her target. As per patient, she is compliant with her medications and diet and regularly follows a dietician.  On further inquiry, she Denies ever having had chest pain, shortness of breath, vomiting, dizziness, diaphoresis, cough, fever, leg pain or swelling, recent long distance travel.     Vitals at arrival time in ED :   · Temp (F)	97.9  · Temp (C) Temp (C)	36.6  · Temp site Temp Site	oral  · Heart Rate Heart Rate (beats/min)	90  · BP Systolic Systolic	 213  · BP Diastolic Diastolic (mm Hg)	92  · Respiration Rate (breaths/min) Respiration Rate (breaths/min)	19  · SpO2 (%) SpO2 (%)	98    Labs: no significant findings   EKG: showed TWI in lateral leads. CODE STEMI Called.  Case was discussed with interventional cardiologist on call and code STEMI was cancelled.     Patient admitted for hypertensive Emergency     During this admission    #Hypertensive Urgency  - BP at arrival 213/19  - Received Labetalol IV  - Hydralazine increased to 50 q6h  - Valsartan increased 160 mg daily  - Trop negative  - Closely monitor BP   HPI:  72-year-old female with past medical history of hypertension, DM-2, hyperlipidemia presented to the ED for being anxious and not feeling well. Patient states that she was not feeling well since this morning and she was hypertensive at home. She states that, this all started when she had an argument with the [employer]  today morning to get her over to the ophthalmology clinic for an appointment today. Her anxiety worsened when she came to know at the eye clinic that her appointment is scheduled for tomorrow and not today. She states that after all this she became very anxious, and frustrated and decided to go the hospital as she was thinking some thing is wrong with her body. Also reports active headache. She further states that this is her first visit to this hospital and she usually go a hospital in the [place] where she has established care from many years. As per patient, her Blood pressure is not well controlled despite taking multiple medications for her BP. She regularly records her BP at home and readings are frequently around 180s mmhg and they rarely reach 150mmhg which is her target. As per patient, she is compliant with her medications and diet and regularly follows a dietician.  On further inquiry, she Denies ever having had chest pain, shortness of breath, vomiting, dizziness, diaphoresis, cough, fever, leg pain or swelling, recent long distance travel.     Vitals at arrival time in ED :   · Temp (F)	97.9  · Temp (C) Temp (C)	36.6  · Temp site Temp Site	oral  · Heart Rate Heart Rate (beats/min)	90  · BP Systolic Systolic	 213  · BP Diastolic Diastolic (mm Hg)	92  · Respiration Rate (breaths/min) Respiration Rate (breaths/min)	19  · SpO2 (%) SpO2 (%)	98    Labs: no significant findings   EKG: showed TWI in lateral leads. CODE STEMI Called.  Case was discussed with interventional cardiologist on call and code STEMI was cancelled.     Patient admitted for hypertensive Emergency     During this admission    #Hypertensive Urgency  - BP at arrival 213/19  - Received Labetalol IV  - Hydralazine increased to 50 q6h  - Valsartan increased 160 mg daily  - Trop negative  - Closely monitor BP      BP rechecked prior to /60, pt is asymptomatic

## 2023-03-02 NOTE — DISCHARGE NOTE PROVIDER - NSDCMRMEDTOKEN_GEN_ALL_CORE_FT
atorvastatin 80 mg oral tablet: 1 tab(s) orally once a day  empagliflozin 25 mg oral tablet: 1 tab(s) orally once a day (in the morning)  furosemide 40 mg oral tablet: 1 tab(s) orally once a day  hydrALAZINE 50 mg oral tablet: 1 tab(s) orally 4 times a day  Lantus 100 units/mL subcutaneous solution: 30 unit(s) subcutaneous once a day (at bedtime)  latanoprost 0.005% ophthalmic solution: 1 drop(s) to each affected eye once a day (in the evening)  Metoprolol Succinate  mg oral tablet, extended release: 1 tab(s) orally once a day  nitroglycerin 0.4 mg sublingual tablet: sublingual prn for chest pain  oxybutynin 10 mg/24 hr oral tablet, extended release: 1 tab(s) orally once a day  Ozempic: 1 application subcutaneous once a week  valsartan 160 mg oral tablet: 1 tab(s) orally once a day

## 2023-03-07 DIAGNOSIS — E11.649 TYPE 2 DIABETES MELLITUS WITH HYPOGLYCEMIA WITHOUT COMA: ICD-10-CM

## 2023-03-07 DIAGNOSIS — I16.1 HYPERTENSIVE EMERGENCY: ICD-10-CM

## 2023-03-07 DIAGNOSIS — Z79.4 LONG TERM (CURRENT) USE OF INSULIN: ICD-10-CM

## 2023-03-07 DIAGNOSIS — F41.9 ANXIETY DISORDER, UNSPECIFIED: ICD-10-CM

## 2023-03-07 DIAGNOSIS — I10 ESSENTIAL (PRIMARY) HYPERTENSION: ICD-10-CM

## 2023-03-07 DIAGNOSIS — E78.5 HYPERLIPIDEMIA, UNSPECIFIED: ICD-10-CM

## 2023-03-07 DIAGNOSIS — Z79.84 LONG TERM (CURRENT) USE OF ORAL HYPOGLYCEMIC DRUGS: ICD-10-CM

## 2023-03-07 DIAGNOSIS — R94.31 ABNORMAL ELECTROCARDIOGRAM [ECG] [EKG]: ICD-10-CM

## 2023-03-08 PROBLEM — E78.5 HYPERLIPIDEMIA, UNSPECIFIED: Chronic | Status: ACTIVE | Noted: 2023-03-01

## 2023-03-08 PROBLEM — I10 ESSENTIAL (PRIMARY) HYPERTENSION: Chronic | Status: ACTIVE | Noted: 2023-03-01

## 2023-03-08 PROBLEM — E11.9 TYPE 2 DIABETES MELLITUS WITHOUT COMPLICATIONS: Chronic | Status: ACTIVE | Noted: 2023-03-01

## 2023-04-10 ENCOUNTER — OUTPATIENT (OUTPATIENT)
Dept: OUTPATIENT SERVICES | Facility: HOSPITAL | Age: 73
LOS: 1 days | End: 2023-04-10
Payer: MEDICARE

## 2023-04-10 ENCOUNTER — APPOINTMENT (OUTPATIENT)
Dept: OPHTHALMOLOGY | Facility: CLINIC | Age: 73
End: 2023-04-10
Payer: MEDICARE

## 2023-04-10 DIAGNOSIS — H25.89 OTHER AGE-RELATED CATARACT: ICD-10-CM

## 2023-04-10 PROCEDURE — 92136 OPHTHALMIC BIOMETRY: CPT | Mod: 26

## 2023-04-10 PROCEDURE — 92136 OPHTHALMIC BIOMETRY: CPT

## 2023-04-14 DIAGNOSIS — H25.13 AGE-RELATED NUCLEAR CATARACT, BILATERAL: ICD-10-CM
